# Patient Record
Sex: MALE | Race: WHITE
[De-identification: names, ages, dates, MRNs, and addresses within clinical notes are randomized per-mention and may not be internally consistent; named-entity substitution may affect disease eponyms.]

---

## 2018-03-31 ENCOUNTER — HOSPITAL ENCOUNTER (EMERGENCY)
Dept: HOSPITAL 68 - ERH | Age: 33
End: 2018-03-31
Payer: COMMERCIAL

## 2018-03-31 VITALS — WEIGHT: 175 LBS | HEIGHT: 69 IN | BODY MASS INDEX: 25.92 KG/M2

## 2018-03-31 VITALS — SYSTOLIC BLOOD PRESSURE: 142 MMHG | DIASTOLIC BLOOD PRESSURE: 72 MMHG

## 2018-03-31 DIAGNOSIS — S82.831A: Primary | ICD-10-CM
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
## 2018-03-31 NOTE — RADIOLOGY REPORT
EXAMINATION:
RIGHT FOOT AND ANKLE RADIOGRAPHS
 
CLINICAL INFORMATION:
32-year-old man with pain post injury.
 
COMPARISON:
None
 
TECHNIQUE:
3 views of the right ankle and 3 views of the right foot were obtained.
 
FINDINGS:
Ankle: There is an oblique fracture through the distal fibula metaphysis at
the level of the talar dome. There is widening of the medial clear space
suggesting disruption of the tibiotalar ligaments. The tibiofibular
syndesmosis is potentially intact. There is a moderate joint effusion.
 
Foot: There is no evidence of acute fracture. Alignment remains anatomic.
Articular cartilage spaces are preserved.
 
IMPRESSION:
Euceda B distal fibular fracture.

## 2018-04-05 ENCOUNTER — HOSPITAL ENCOUNTER (OUTPATIENT)
Dept: HOSPITAL 68 - STS | Age: 33
End: 2018-04-05
Attending: ORTHOPAEDIC SURGERY
Payer: COMMERCIAL

## 2018-04-05 VITALS — BODY MASS INDEX: 25.92 KG/M2 | WEIGHT: 175 LBS | HEIGHT: 69 IN

## 2018-04-05 DIAGNOSIS — M25.371: ICD-10-CM
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
## 2018-04-05 DIAGNOSIS — M54.89: ICD-10-CM

## 2018-04-05 DIAGNOSIS — K21.9: ICD-10-CM

## 2018-04-05 DIAGNOSIS — G89.29: ICD-10-CM

## 2018-04-05 DIAGNOSIS — S82.61XA: Primary | ICD-10-CM

## 2018-04-05 DIAGNOSIS — I10: ICD-10-CM

## 2018-04-05 PROCEDURE — C1713 ANCHOR/SCREW BN/BN,TIS/BN: HCPCS

## 2018-04-06 NOTE — RADIOLOGY REPORT
EXAMINATION:
Intraoperative fluoroscopy
 
CLINICAL INFORMATION:
Right ankle ORIF
 
COMPARISON:
Radiographs of the right ankle 03/31/2018
 
TECHNIQUE:
Intraoperative fluoroscopy was provided for use by Dr. Webster.  A total of
55 images were saved to PACS.
 
TOTAL FLUOROSCOPIC TIME:
54 seconds
 
FINDINGS\E\IMPRESSION:
Intraoperative fluoroscopy provided for use by Dr. Webster.  Please see
operative note for detailed findings.

## 2018-04-08 NOTE — OPERATIVE REPORT
Operative/Inv Procedure Report
Surgery Date: 04/05/18
Name of Procedure:
1)     Right Lateral Malleolus Ankle Fracture Open Reduction And Internal 
Fixation (Eleanor)
2)     Right Lateral Ankle Dislocation Open Reduction And Stabilization (
Eleanor)
3)     Right Knee Joint Stress Application For Radiologic Assessment Of 
Stability (Eleanor)
4)     Radiologic Examination Of The Right Knee To Rule Out Occult Fracture Or 
Other Undetected Abnormality To Account For Recently Increased Knee And Proximal
Calf Region Symptoms (Eleanor)
5)     Radiologic Examination Of The Right Proximal Tibia And Fibula To Rule Out
Occult Fracture Or Other Undetected Abnormality To Account For Recently 
Increased Knee And Proximal Calf Region Symptoms (Eleanor)
 
Pre-Operative Diagnosis:
Primary Surgically Treated Diagnoses:
 
1)     Right Lateral Malleolus Ankle Fracture
2)     Right Lateral Ankle Subluxation And Instability
 
Post-Operative Diagnosis:
Same as preoperative diagnosis list.
 
Estimated Blood Loss: scant
Surgeon/Assistant:
PAVEL WEBSTER MD  - Primary Consulting Orthopaedic Surgeon
 
Surgical Providers:
Pavel Webster M.D.  - Orthopaedic Surgeon
 
Anesthesia: laryngeal mask airway
Monitors:
Standard general anesthesia and other perioperative monitoring was performed per
anesthesia.
Refer to anesthesia records for details.
 
IV Fluids:
Standard anesthesia perioperative fluid management was performed without 
requirement for additional or emergent fluid resuscitation.  Refer to anesthesia
records for details.
 
Implants:
Implants Placed:
     Lateral Ankle Implants:
          Distal Fibular Plate-Screw Fixation Instrumentation:
               Distal Fibula Plate:
                    1   x   Janene Lateral Distal Fibular Locking Plate
               Plate Fixation Screws:
                    6   x   full cortical thread locking self-tapping fixation 
screws
                    4   x   full cortical thread non-locking self-tapping 
fixation screws
Graft Placed:
     None
 
Urine Output:
Refer to anesthesia records for details.
 
Drains:
None
 
Specimens:
None
 
Tourniquet:
Mid-thigh tourniquette applied over Webril padding and inflated to 250 mm Hg x 
81 minutes after exsanguination as described in body of operative report.
 
Complications:
None
 
 
Operative/Procedure Note
Note:
Preoperative Holding Area Assessment/Preparation:
 
 The patient was evaluated in the preoperative holding area prior to surgery and
no clinical changes or contraindications to surgical intervention were 
documented compared to the normal postinjury and preoperative baseline deficits 
localized to the right ankle related to his lateral malleolar fracture-
dislocation as identified on preoperative emergency department, orthopaedic 
office and admission clearance evaluations.  His right foot distal neurovascular
assessment was normal and unchanged from clearance examinations.  There was no 
gross swelling noted although examination was limited because of the splint.  
There was no passive stretch pain on toe movement.  As in the office, the 
patient was otherwise grossly neurovascularly intact in both lower extremities 
to standard testing and had no signs of other significant areas of injury, 
symptomatology or deficit on general screening immediate preoperative 
examination.  The surgical plan and site were confirmed with the patient and 
preoperative paperwork was finalized.  The region of the intended surgical site 
was marked and the accessible extremity above and below the splint was cleansed 
and prepped per protocol.  The primary surgeon, anesthesia care team members, 
and operating room staff confirmed the patient identity, surgical procedure, and
operative site as well as other clinical details with the patient in an initial 
documented preoperative confirmation (awake time out) prior to the 
administration of sedation or anesthesia.
 
 
Surgical Procedure:
 
 The procedure was performed by Dr. Webster who was present and served as the 
primary surgeon for all critical intraoperative and perioperative decisions and 
interventions.
 
Set-Up/Positioning/Exposure -
 
 The patient was brought to the operating room in stable condition, assisted in 
transferring himself to the operating table and positioned supine per standard 
protocol for ankle fracture ORIF.  He then underwent uncomplicated induction of 
general anesthesia with placement of laryngeal mask airway as well as all 
appropriate monitors, lines and catheters without difficulty.  Prophylactic 
antibiotic administration of 1 gram of IV Vancomycin based on patient body mass 
was performed for surgical prophylaxis.  Vancomycin was used because of the 
patient's history of previous diagnosis and treatment requirement for an MRSA 
cutaneous infection several years ago.  Antibiotic administration was completed 
at least 30 and less than 60 minutes prior to making an incision per orthopaedic
surgical operative open internal fixation fracture care operative protocols.  A 
roll was placed under the right hemipelvis to align his right leg in a more 
vertical resting position so as to optimize access particularly to the lateral 
side of the ankle.  His splint was removed and his tissues checked.  There was 
moderate but not excessive swelling evident with at least partial visualization 
of normal cutaneous topography.  No contraindications to surgical intervention 
were noted.  The arms were abducted less than 90 degrees at the shoulders and 
supported on well-padded arm-boards with all pressure points padded.  The 
surgeon, anesthesia care team, and operating room staff again documented the 
patient identity, surgical procedure, and operative site as well as other 
clinical details in a final documented confirmation (final time out) prior to 
beginning the procedure.
 Anteroposterior and mortise view fluoroscopic images were obtained to optimally
assess the fracture pattern and degree of instability as well as to confirm that
the ankle dislocation was reducible without any evidence of a physical block to 
reduction which might require separate medial dissection.  The ankle mortise did
appear to fully reduce and so open surgical intervention was isolated to the 
lateral side of the ankle.  Because the patient reported increasing pain in the 
anterolateral proximal calf just below the knee over the last several days prior
to surgery, fluoroscopy was also used to evaluate both the knee and the full 
length of the tibia and fibula radiologically.  There was no evidence of 
fracture, dislocation or proximal instability on stress assessment of the knee 
and proximal interosseous ligament.
 After sterile prep and drape using standard technique with DuraPrep, an 
incision was mapped and made extending longitudinally from the distal tip of the
lateral malleolus to a level approximately 3 cm above the fracture site.  
Cutaneous hemostasis was achieved using Bovie electrocautery.  The incision was 
then covered and the right lower extremity was exsanguinated with an Esmark.  
The tourniquet was inflated to 250 mm of Hg.  The overlying soft tissues were 
divided longitudinally to expose the lateral margin of the distal fibula and the
full extent of the oblique fibula fracture.  Care was taken to preserve the 
anterior and posterior soft tissue attachments where possible to optimize blood 
flow for fracture healing.  The ankle dislocation was reduced and this 
facilitated realignment of the two primary portions of the fractured distal 
fibula.  The dislocation was sufficiently unstable to require distinct 
procedural intervention of continuous manual reduction until the fibula fracture
was stabilized in its reduced position.  The fracture was found to extend from 
proximal-posterior to distal-anterior correlating with the preoperative 
radiographs.  There was only minimal comminution.  The fracture site was 
debrided of coagulated hematoma and soft tissue using a curette.  The fracture 
site was slightly distracted and thoroughly irrigated into the ankle joint using
manual pressure bulb anibiotic irrigation technique prior to final reduction and
fixation.  The fracture was then reduced to a near-anatomic position with a 
tenaculum clamp.  Fracture and dislocation reduction, alignment and stability 
were evaluated on fluoroscopic views and found to be optimal.
 An appropriate length Janene locking distal lateral fibula plate was selected 
and applied to the lateral margin of the distal fibula.  It was found to match 
the normal anatomy of this region and did not require additional contouring,  
Single distal and proximal nonlocking screws were placed above and below the 
fracture using standard screw placement technique of drilling, measuring with a 
depth gauge and then placing an appropriate length screw at each site with good 
bicortical purchase and fixation.  These initial screw served to approximate the
plate to the fibula and further stabilize the fracture.  An anterior to 
posterior lag screw was then placed across and orthogonal to the fracture line 
using standard lag technique of drilling both cortices, overdrilling the 
anterior cortex, measuring with a depth gauge and placing appropriate length 
self-tapping screw with good fixation and lag effect.  The remaining plate holes
were filled with primarily locking screws proximally and primarily non-locking 
screws distally using identical technique to that described above and with good 
proximal bicortical and distal unicortical purchase and fixation as per the 
construct design.  Optimal position and fixation was confirmed throughout this 
process and after completion of all hardware placement using anteroposterior, 
lateral and mortise fluoroscopic images of the ankle.  Final images were 
uploaded to the [facility] PACS.  Postoperative ankle joint stability was 
documented under medial,  lateral, internal rotation, external rotation, 
inversion and eversion stress  application with no evidence of residual 
instability or subluxation.
 
Closure/Recovery -
 
 The surgical site was thoroughly irrigated and hemostasis was carefully 
achieved prior to closure.  Initial counts were correct prior to closure.  The 
deep muscular closure was performed with #2-0 Vicryl interrupted, simple suture 
technique and provided good plate coverage throughout the length of the 
construct.  The superficial subcutaneous layer was closed with #3-0 undyed 
Vicryl inverted, interrupted, simple sutures.  The skin was closed using staples
with the edges everted.  A standard, sterile dressing of Xeroform and folded 
fluff gauze 6x6s was applied and held in place with sterile Webril wrapping 
taking care to avoid tension or folds in the wrap.  All final counts were 
correct prior to removing the drapes.  A triplane (posterior and sugartong) 
splint was applied and wrapped with ace wraps taking care to avoid any folds in 
the splint and any excessive tension in the wrap.  The ankle was held in the 
plantigrade position until the splint was rigid after which the splint was 
sufficient to maintain this optimal position.  The layngeal mask airway was 
removed without difficulty and the patient was transferred to the stretcher 
taking care to support and protect the operative leg during transfer.
 
 
Recovery Room Assessment:
 
 The patient was transported to the recovery room in stable condition.  
Neurological assessmnet was not possible because of the anesthetic regional 
block used preoperatively (which was still providing excellent anesthetic and 
analgesic effect).  There was no suggestion of any adverse condition, new 
deficits or worsening of his pre-operative symptoms otherwise on initial 
recovery from anesthesia.  The patient will follow the usual postoperative 
protocol for ankle ORIF with some adjustments and accommodations made to the 
standard protocol because of his preoperative ankle instability and his 
preoperative dependence on intermediate dose narcotic medication for pain 
control of a chronic lumbar post-surgical condition making it very difficult to 
mobilize with crutches or a walker which may increase his medication 
requirements and potentially slow his recovery compared to other patients.
 His postoperative care plan will include early mobilization but strict non-
weight-bearing on the right, oral medication pain control and same day home 
discharge planning with instructions regarding splint care as well as use of ice
and elevation.  He will be seen in 2 weeks at the office for splint removal, 
surgical site check, radiographs and application of short leg cast.  He will 
contact the office earlier for any problems or concerns.  His preoperative 
Oxycodone dose was 10 mg PO q6 hours.  He was prescribed a standard tapering 
dose of Oxycodone appropriate to the surgery performed which he will use above 
and beyond his baseline regimen.  Since he is now out of his medication from 
pain management, I will now provide him with both his chronic and acute doses 
for the next two weeks after which he will return to pain management for any 
further prescriptions.  He was also prescribed Diazepam to help control any 
spasm which he may have related to his injury and/or the surgical procedure.
 
Discharge Disposition: PACU
CC:
Eleanor RAYMUNDO,Pavel COLBERT